# Patient Record
Sex: MALE | Race: WHITE | NOT HISPANIC OR LATINO | Employment: FULL TIME | URBAN - METROPOLITAN AREA
[De-identification: names, ages, dates, MRNs, and addresses within clinical notes are randomized per-mention and may not be internally consistent; named-entity substitution may affect disease eponyms.]

---

## 2020-03-12 ENCOUNTER — OFFICE VISIT (OUTPATIENT)
Dept: FAMILY MEDICINE CLINIC | Facility: CLINIC | Age: 55
End: 2020-03-12
Payer: COMMERCIAL

## 2020-03-12 VITALS
BODY MASS INDEX: 31.98 KG/M2 | RESPIRATION RATE: 16 BRPM | HEART RATE: 76 BPM | WEIGHT: 228.4 LBS | HEIGHT: 71 IN | DIASTOLIC BLOOD PRESSURE: 78 MMHG | SYSTOLIC BLOOD PRESSURE: 136 MMHG | TEMPERATURE: 98.6 F

## 2020-03-12 DIAGNOSIS — F51.05 INSOMNIA SECONDARY TO DEPRESSION WITH ANXIETY: ICD-10-CM

## 2020-03-12 DIAGNOSIS — R25.2 LEG CRAMPS: ICD-10-CM

## 2020-03-12 DIAGNOSIS — Z72.0 TOBACCO ABUSE: ICD-10-CM

## 2020-03-12 DIAGNOSIS — Z12.11 SCREENING FOR MALIGNANT NEOPLASM OF COLON: ICD-10-CM

## 2020-03-12 DIAGNOSIS — Z76.89 ENCOUNTER TO ESTABLISH CARE: Primary | ICD-10-CM

## 2020-03-12 DIAGNOSIS — F41.8 INSOMNIA SECONDARY TO DEPRESSION WITH ANXIETY: ICD-10-CM

## 2020-03-12 PROCEDURE — 99204 OFFICE O/P NEW MOD 45 MIN: CPT | Performed by: FAMILY MEDICINE

## 2020-03-12 PROCEDURE — 3008F BODY MASS INDEX DOCD: CPT | Performed by: FAMILY MEDICINE

## 2020-03-12 RX ORDER — TRAZODONE HYDROCHLORIDE 50 MG/1
50 TABLET ORAL
Qty: 30 TABLET | Refills: 0 | Status: SHIPPED | OUTPATIENT
Start: 2020-03-12

## 2020-03-12 RX ORDER — HYDROXYZINE HYDROCHLORIDE 25 MG/1
TABLET, FILM COATED ORAL
Qty: 30 TABLET | Refills: 0 | Status: SHIPPED | OUTPATIENT
Start: 2020-03-12

## 2020-03-12 RX ORDER — NICOTINE 21 MG/24HR
1 PATCH, TRANSDERMAL 24 HOURS TRANSDERMAL EVERY 24 HOURS
Qty: 28 PATCH | Refills: 0 | Status: SHIPPED | OUTPATIENT
Start: 2020-03-12

## 2020-03-12 NOTE — PROGRESS NOTES
Montez Helms 1965 male MRN: [id number]    [address] [facility]  Follow-up Visit    ASSESSMENT/PLAN  Montez Helms is a 47 y o  male  presents to the office for     Diagnoses and all orders for this visit:    Encounter to establish care    Screening for malignant neoplasm of colon  -     Cancel: Ambulatory referral to Gastroenterology; Future  -     Ambulatory referral to Gastroenterology; Future    Insomnia secondary to depression with anxiety  -     traZODone (DESYREL) 50 mg tablet; Take 1 tablet (50 mg total) by mouth daily at bedtime  -     hydrOXYzine HCL (ATARAX) 25 mg tablet; 1-2 tablets  At bedtime  As needed for sleep  -     Ambulatory referral to Psychiatry; Future    Tobacco abuse  -     nicotine (NICODERM CQ) 14 mg/24hr TD 24 hr patch; Place 1 patch on the skin every 24 hours    Leg cramps  -     Comprehensive metabolic panel; Future  -     TSH, 3rd generation with Free T4 reflex; Future    Given that this is the 1st time we meet  Would recommend the patient start on trazodone 50 mg/Atarax as needed for bedtime  I do believe some of patient's symptoms will improve with sleep  Reviewed the biometrics  LDL slightly elevated  Recommended diet and exercise  As well as smoking cessation  I believe that the numbers of elevated blood pressures and elevated LDL will improve with discontinuing  Nicotine patch prescribed to patient  Highly recommend that the patient see gastroenterology for colonoscopy  Screening lab sent for the patient  Scanned in biometrics to chart    Disposition: Return to the office in 1 month    Health Maintence:  BMI Counseling: Body mass index is 31 86 kg/m²  The BMI is above normal  Nutrition recommendations include decreasing portion sizes and consuming healthier snacks  Exercise recommendations include exercising 3-5 times per week         Depression Screening and Follow-up Plan: Patient's depression screening was positive with a PHQ-2 score of 5  Their PHQ-9 score was 17  Patient advised to follow-up with PCP for further management  Future Appointments   Date Time Provider Payton Epps   3/12/2020  6:15 PM Jessica Almonte MD [facility]          SUBJECTIVE  CC: Establish Care (was advised to discuss biometric test results done at work also wants to quit smoking)      HPI:  Jesús Alberto is a 47 y o  male  presenting to the office to establish care  Unfortunately the patient just recently lost his wife who was a patient of mine  Patient states that since her passing in June of 2019 he has been very depressed  He has no family support in the area  He has 1 sister but has multiple children therefore has limited time for her brother  Patient has a strong family history of diabetes  Patient's mother has a history of colon cancer at the age of 79 and the patient himself has no history of any colonoscopies  Patient denies any rectal bleeding  Patient states that his main concerns are his depression with insomnia  At times he does have anxiety  States that prior to the passing of his wife he did not have any these symptoms  States that he has resorted to smoking and has cut himself down from a pack to half a pack  Patient is willing to try any medications to help him stop smoking  Does have leg cramps at nighttime recently  Patient has a waking given that he has been eating poorly since June  Denies any other acute concerns today  Review of Systems   Constitutional: Negative for activity change, appetite change, chills, fatigue and fever  HENT: Negative for congestion  Respiratory: Negative for cough, chest tightness and shortness of breath  Cardiovascular: Negative for chest pain and leg swelling ( like cramps)  Gastrointestinal: Negative for abdominal distention, abdominal pain, constipation, diarrhea, nausea and vomiting  Psychiatric/Behavioral: Positive for sleep disturbance   Negative for suicidal ideas  The patient is nervous/anxious  Depression   All other systems reviewed and are negative  Historical Information   The patient history was reviewed as follows:    History reviewed  No pertinent past medical history  Past Surgical History:   Procedure Laterality Date    NO PAST SURGERIES       Family History   Problem Relation Age of Onset    Heart disease Mother    [facility] Varma Breast cancer Mother     Colon cancer Mother     Heart disease Father     Diabetes Father       Social History   Social History     Substance and Sexual Activity   Alcohol Use Not on file     Social History     Substance and Sexual Activity   Drug Use Not on file     Social History     Tobacco Use   Smoking Status Current Every Day Smoker   Smokeless Tobacco Former User       Medications:   No current outpatient medications on file  No Known Allergies    OBJECTIVE    Vitals:   Vitals:    03/12/20 1808   BP: 136/78   Pulse: 76   Resp: 16   Temp: 98 6 °F (37 °C)   Weight: 104 kg (228 lb 6 4 oz)   Height: 5' 10" (1 778 m)           Physical Exam   Constitutional: He is oriented to person, place, and time  Vital signs are normal  He appears well-developed and well-nourished  HENT:   Head: Normocephalic and atraumatic  Right Ear: External ear normal    Left Ear: External ear normal    Nose: Nose normal    Mouth/Throat: Oropharynx is clear and moist    Eyes: Pupils are equal, round, and reactive to light  Conjunctivae and EOM are normal    Neck: Normal range of motion  Neck supple  Cardiovascular: Normal rate, regular rhythm, S1 normal, S2 normal, normal heart sounds and intact distal pulses  No murmur heard  Pulmonary/Chest: Effort normal and breath sounds normal  No respiratory distress  He has no wheezes  Abdominal: Soft  Bowel sounds are normal  He exhibits no distension  There is no tenderness  Musculoskeletal: Normal range of motion  He exhibits no edema     Neurological: He is alert and oriented to person, place, and time  He has normal strength  Skin: Skin is warm  Capillary refill takes less than 2 seconds  Psychiatric: He has a normal mood and affect  His speech is normal and behavior is normal  Judgment and thought content normal    Vitals reviewed           Anisha Martínez MD  [facility] [id number]

## 2020-03-14 PROBLEM — Z72.0 TOBACCO ABUSE: Status: ACTIVE | Noted: 2020-03-14

## 2020-03-14 PROBLEM — R25.2 LEG CRAMPS: Status: ACTIVE | Noted: 2020-03-14

## 2020-03-14 PROBLEM — F41.8 INSOMNIA SECONDARY TO DEPRESSION WITH ANXIETY: Status: ACTIVE | Noted: 2020-03-14

## 2020-03-14 PROBLEM — F51.05 INSOMNIA SECONDARY TO DEPRESSION WITH ANXIETY: Status: ACTIVE | Noted: 2020-03-14

## 2020-06-05 ENCOUNTER — TELEPHONE (OUTPATIENT)
Dept: BEHAVIORAL/MENTAL HEALTH CLINIC | Facility: CLINIC | Age: 55
End: 2020-06-05

## 2020-10-08 ENCOUNTER — TELEPHONE (OUTPATIENT)
Dept: GASTROENTEROLOGY | Facility: CLINIC | Age: 55
End: 2020-10-08